# Patient Record
Sex: MALE
[De-identification: names, ages, dates, MRNs, and addresses within clinical notes are randomized per-mention and may not be internally consistent; named-entity substitution may affect disease eponyms.]

---

## 2017-07-23 ENCOUNTER — CHARTING TRANS (OUTPATIENT)
Dept: OTHER | Age: 47
End: 2017-07-23

## 2019-01-21 PROBLEM — E78.00 HYPERCHOLESTEROLEMIA: Status: ACTIVE | Noted: 2019-01-21

## 2019-01-25 PROCEDURE — 83516 IMMUNOASSAY NONANTIBODY: CPT | Performed by: INTERNAL MEDICINE

## 2019-01-25 PROCEDURE — 84466 ASSAY OF TRANSFERRIN: CPT | Performed by: INTERNAL MEDICINE

## 2019-01-25 PROCEDURE — 80074 ACUTE HEPATITIS PANEL: CPT | Performed by: INTERNAL MEDICINE

## 2020-03-12 ENCOUNTER — OFFICE VISIT (OUTPATIENT)
Dept: FAMILY MEDICINE CLINIC | Facility: CLINIC | Age: 50
End: 2020-03-12
Payer: COMMERCIAL

## 2020-03-12 VITALS
DIASTOLIC BLOOD PRESSURE: 72 MMHG | SYSTOLIC BLOOD PRESSURE: 118 MMHG | BODY MASS INDEX: 27.95 KG/M2 | WEIGHT: 199.63 LBS | HEART RATE: 98 BPM | RESPIRATION RATE: 16 BRPM | TEMPERATURE: 98 F | OXYGEN SATURATION: 99 % | HEIGHT: 71 IN

## 2020-03-12 DIAGNOSIS — J32.1 CHRONIC FRONTAL SINUSITIS: Primary | ICD-10-CM

## 2020-03-12 DIAGNOSIS — J34.89 RHINORRHEA: ICD-10-CM

## 2020-03-12 DIAGNOSIS — J32.0 CHRONIC MAXILLARY SINUSITIS: ICD-10-CM

## 2020-03-12 PROCEDURE — 99213 OFFICE O/P EST LOW 20 MIN: CPT | Performed by: NURSE PRACTITIONER

## 2020-03-12 RX ORDER — AMOXICILLIN AND CLAVULANATE POTASSIUM 875; 125 MG/1; MG/1
1 TABLET, FILM COATED ORAL 2 TIMES DAILY
Qty: 20 TABLET | Refills: 0 | Status: SHIPPED | OUTPATIENT
Start: 2020-03-12 | End: 2020-03-22

## 2020-03-12 NOTE — PATIENT INSTRUCTIONS
·  PLAN: Augmentin, take as directed. Finish all the medication even if you feel better. · Probiotics or yogurt daily during antibiotic use will help decrease stomach upset and restore good bacteria to the gut.   · Continue allergy medications and saline

## 2020-03-12 NOTE — PROGRESS NOTES
HPI:   Laurie Vasquez is a 47year old male who presents with ill symptoms for  1  months. Patient reports sinus congestion and headache waxing and waning. Denies fever or cough. Has tried allergy medication and saline rinses with not much relief.  Followed [facility] midline. Mild sinus tenderness with palpation. NECK: supple,no adenopathy  LUNGS: clear to auscultation all lobes  CARDIO: RRR without murmur      ASSESSMENT AND PLAN:    PLAN:Gustavo was seen today for sinus problem and headache.     Diagnoses and immediate care if inability to swallow or breathe.